# Patient Record
Sex: FEMALE | Race: BLACK OR AFRICAN AMERICAN | NOT HISPANIC OR LATINO
[De-identification: names, ages, dates, MRNs, and addresses within clinical notes are randomized per-mention and may not be internally consistent; named-entity substitution may affect disease eponyms.]

---

## 2020-09-11 PROBLEM — Z00.00 ENCOUNTER FOR PREVENTIVE HEALTH EXAMINATION: Status: ACTIVE | Noted: 2020-09-11

## 2020-09-16 ENCOUNTER — APPOINTMENT (OUTPATIENT)
Dept: NEUROLOGY | Facility: CLINIC | Age: 35
End: 2020-09-16
Payer: MEDICAID

## 2020-09-16 VITALS
HEART RATE: 85 BPM | HEIGHT: 64 IN | TEMPERATURE: 98.1 F | WEIGHT: 253 LBS | SYSTOLIC BLOOD PRESSURE: 117 MMHG | OXYGEN SATURATION: 99 % | DIASTOLIC BLOOD PRESSURE: 83 MMHG | BODY MASS INDEX: 43.19 KG/M2

## 2020-09-16 DIAGNOSIS — Z87.09 PERSONAL HISTORY OF OTHER DISEASES OF THE RESPIRATORY SYSTEM: ICD-10-CM

## 2020-09-16 DIAGNOSIS — Z78.9 OTHER SPECIFIED HEALTH STATUS: ICD-10-CM

## 2020-09-16 DIAGNOSIS — F17.200 NICOTINE DEPENDENCE, UNSPECIFIED, UNCOMPLICATED: ICD-10-CM

## 2020-09-16 DIAGNOSIS — J45.909 UNSPECIFIED ASTHMA, UNCOMPLICATED: ICD-10-CM

## 2020-09-16 DIAGNOSIS — R20.2 ANESTHESIA OF SKIN: ICD-10-CM

## 2020-09-16 DIAGNOSIS — R20.0 ANESTHESIA OF SKIN: ICD-10-CM

## 2020-09-16 LAB
ESTIMATED AVERAGE GLUCOSE: 100 MG/DL
HBA1C MFR BLD HPLC: 5.1 %

## 2020-09-16 PROCEDURE — 99205 OFFICE O/P NEW HI 60 MIN: CPT

## 2020-09-16 RX ORDER — IBUPROFEN 200 MG/1
CAPSULE, LIQUID FILLED ORAL
Refills: 0 | Status: ACTIVE | COMMUNITY

## 2020-09-16 NOTE — REVIEW OF SYSTEMS
[As Noted in HPI] : as noted in HPI [Muscle Weakness] : muscle weakness [Fever] : no fever [Anxiety] : no anxiety [Eyesight Problems] : no eyesight problems [Loss Of Hearing] : no hearing loss [Chest Pain] : no chest pain [Vomiting] : no vomiting [Cough] : no cough [Joint Pain] : no joint pain [Incontinence] : no incontinence [Itching] : no itching [Easy Bleeding] : no tendency for easy bleeding

## 2020-09-16 NOTE — HISTORY OF PRESENT ILLNESS
[FreeTextEntry1] : The patient has h/o of cervical and lumbar radiculopathy which has been stable for years and is here for numbness and tingling in the arms and the legs which started again about 2 weeks ago.  The patient was riding her bike for longer that usual, then played basketball and went to work.  This was more than her usual activity level. Afterwards she started feeling pain radiating to her right leg as well as numbness and tingling in her hands.  She noticed neck and back pain limiting her ambulation.  No bowel or bladder incontiance or saddle anesthesia.  \par \par The patient went to [facility] and had Xrays there, reports are unavailable.  She was started on Neurontin 300mg tid but has taken it only for few days.  She continues to have the pain.

## 2020-09-16 NOTE — ASSESSMENT
[FreeTextEntry1] : The patient has acute on chronic cervical and lumbar radiculopathy, her exam is notable for sensory loss to LT in bl arms and legs in stocking glove distribution raising concerning for possible underlying peripheral neuropathy as well.  WIll get ncs.emg of the arms and the legs, will refer the patient to PT and if the patient's pain continues without relief of the Neurontin 300mg tid she will titrate it to 600mg tid.  Will also get basic neuropathy labs.  The patient will bring copies of her prior Xrays of the C and LS spine.

## 2020-09-16 NOTE — CONSULT LETTER
[Dear  ___] : Dear  [unfilled], [Consult Letter:] : I had the pleasure of evaluating your patient, [unfilled]. [Please see my note below.] : Please see my note below. [Consult Closing:] : Thank you very much for allowing me to participate in the care of this patient.  If you have any questions, please do not hesitate to contact me. [Sincerely,] : Sincerely, [FreeTextEntry3] : Hazel Zimmer MD, MPH\par

## 2020-09-16 NOTE — PHYSICAL EXAM
[General Appearance - Alert] : alert [General Appearance - In No Acute Distress] : in no acute distress [Person] : oriented to person [Place] : oriented to place [Time] : oriented to time [Registration Intact] : recent registration memory intact [Concentration Intact] : normal concentrating ability [Visual Intact] : visual attention was ~T not ~L decreased [Naming Objects] : no difficulty naming common objects [Repeating Phrases] : no difficulty repeating a phrase [Fluency] : fluency intact [Comprehension] : comprehension intact [Vocabulary] : adequate range of vocabulary [Cranial Nerves Optic (II)] : visual acuity intact bilaterally,  visual fields full to confrontation, pupils equal round and reactive to light [Cranial Nerves Oculomotor (III)] : extraocular motion intact [Cranial Nerves Trigeminal (V)] : facial sensation intact symmetrically [Cranial Nerves Facial (VII)] : face symmetrical [Cranial Nerves Vestibulocochlear (VIII)] : hearing was intact bilaterally [Cranial Nerves Glossopharyngeal (IX)] : tongue and palate midline [Cranial Nerves Accessory (XI - Cranial And Spinal)] : head turning and shoulder shrug symmetric [Cranial Nerves Hypoglossal (XII)] : there was no tongue deviation with protrusion [Motor Tone] : muscle tone was normal in all four extremities [Motor Strength] : muscle strength was normal in all four extremities [Involuntary Movements] : no involuntary movements were seen [Abnormal Walk] : normal gait [Balance] : balance was intact [1+] : Ankle jerk left 1+ [Full Pulse] : the pedal pulses are present [Coordination - Dysmetria Impaired Finger-to-Nose Bilateral] : not present [Coordination - Dysmetria Impaired Heel-to-Shin Bilateral] : not present [Plantar Reflex Right Only] : normal on the right [Plantar Reflex Left Only] : normal on the left [FreeTextEntry5] : fundi not visualized [FreeTextEntry7] : sensory loss to LT in bl arms and legs in stocking glove distribution

## 2020-09-18 LAB
ALBUMIN MFR SERPL ELPH: 52.2 %
ALBUMIN SERPL-MCNC: 3.9 G/DL
ALBUMIN/GLOB SERPL: 1.1 RATIO
ALPHA1 GLOB MFR SERPL ELPH: 4.5 %
ALPHA1 GLOB SERPL ELPH-MCNC: 0.3 G/DL
ALPHA2 GLOB MFR SERPL ELPH: 10.6 %
ALPHA2 GLOB SERPL ELPH-MCNC: 0.8 G/DL
B-GLOBULIN MFR SERPL ELPH: 13.1 %
B-GLOBULIN SERPL ELPH-MCNC: 1 G/DL
FOLATE SERPL-MCNC: 8.1 NG/ML
GAMMA GLOB FLD ELPH-MCNC: 1.5 G/DL
GAMMA GLOB MFR SERPL ELPH: 19.6 %
INTERPRETATION SERPL IEP-IMP: NORMAL
M PROTEIN SPEC IFE-MCNC: NORMAL
PROT SERPL-MCNC: 7.4 G/DL
PROT SERPL-MCNC: 7.4 G/DL
T3 SERPL-MCNC: 116 NG/DL
T4 SERPL-MCNC: 6.8 UG/DL
TSH SERPL-ACNC: 1.24 UIU/ML
VIT B12 SERPL-MCNC: 434 PG/ML

## 2020-09-22 LAB — METHYLMALONATE SERPL-SCNC: 127 NMOL/L

## 2020-10-27 ENCOUNTER — APPOINTMENT (OUTPATIENT)
Dept: NEUROLOGY | Facility: CLINIC | Age: 35
End: 2020-10-27
Payer: MEDICAID

## 2020-10-27 VITALS
WEIGHT: 252 LBS | DIASTOLIC BLOOD PRESSURE: 75 MMHG | HEART RATE: 89 BPM | TEMPERATURE: 97.7 F | SYSTOLIC BLOOD PRESSURE: 125 MMHG | BODY MASS INDEX: 43.02 KG/M2 | HEIGHT: 64 IN | OXYGEN SATURATION: 98 %

## 2020-10-27 PROCEDURE — 99072 ADDL SUPL MATRL&STAF TM PHE: CPT

## 2020-10-27 PROCEDURE — 95913 NRV CNDJ TEST 13/> STUDIES: CPT

## 2020-10-27 PROCEDURE — 99213 OFFICE O/P EST LOW 20 MIN: CPT | Mod: 25

## 2020-10-27 PROCEDURE — 95886 MUSC TEST DONE W/N TEST COMP: CPT | Mod: 59

## 2020-10-28 NOTE — ASSESSMENT
[FreeTextEntry1] : The patient has acute on chronic cervical and lumbar radiculopathy, with ncs.emg today showing chronic left L5 and right L4/5 radiculopathy.  WIll cw Neurontin 600mg tid and PT. The patient will bring copies of her prior Xrays of the C and LS spine. \par

## 2020-10-28 NOTE — DATA REVIEWED
[de-identified] : ncs.emg legs shows chronic left L5 and right L4/5 radiculopathy\par basic neuropathy labs unremarkable

## 2020-10-30 ENCOUNTER — APPOINTMENT (OUTPATIENT)
Dept: NEUROLOGY | Facility: CLINIC | Age: 35
End: 2020-10-30
Payer: MEDICAID

## 2020-10-30 VITALS
BODY MASS INDEX: 43.02 KG/M2 | TEMPERATURE: 97.9 F | WEIGHT: 252 LBS | HEART RATE: 86 BPM | HEIGHT: 64 IN | OXYGEN SATURATION: 98 % | DIASTOLIC BLOOD PRESSURE: 81 MMHG | SYSTOLIC BLOOD PRESSURE: 136 MMHG

## 2020-10-30 PROCEDURE — 95913 NRV CNDJ TEST 13/> STUDIES: CPT

## 2020-10-30 PROCEDURE — 99072 ADDL SUPL MATRL&STAF TM PHE: CPT

## 2020-10-30 PROCEDURE — 95886 MUSC TEST DONE W/N TEST COMP: CPT

## 2020-12-18 ENCOUNTER — APPOINTMENT (OUTPATIENT)
Dept: NEUROLOGY | Facility: CLINIC | Age: 35
End: 2020-12-18
Payer: MEDICAID

## 2020-12-18 VITALS
DIASTOLIC BLOOD PRESSURE: 76 MMHG | WEIGHT: 260 LBS | TEMPERATURE: 97.2 F | SYSTOLIC BLOOD PRESSURE: 118 MMHG | HEIGHT: 64 IN | HEART RATE: 92 BPM | BODY MASS INDEX: 44.39 KG/M2

## 2020-12-18 PROCEDURE — 99214 OFFICE O/P EST MOD 30 MIN: CPT

## 2020-12-18 PROCEDURE — 99072 ADDL SUPL MATRL&STAF TM PHE: CPT

## 2020-12-18 RX ORDER — GABAPENTIN 300 MG/1
300 CAPSULE ORAL 3 TIMES DAILY
Qty: 90 | Refills: 5 | Status: DISCONTINUED | COMMUNITY
Start: 2020-09-16 | End: 2020-12-18

## 2020-12-18 NOTE — ASSESSMENT
[FreeTextEntry1] : The patient has acute on chronic cervical and lumbar radiculopathy, with ncs.emg today showing chronic left L5 and right L4/5 radiculopathy and Xray showing  degenerative changes at l4/5. WIll titrate Neurontin to 900mg tid and cw PT.  Will obtain MRI LS spine.\par

## 2020-12-18 NOTE — PHYSICAL EXAM
[General Appearance - Alert] : alert [General Appearance - In No Acute Distress] : in no acute distress [Person] : oriented to person [Place] : oriented to place [Time] : oriented to time [Registration Intact] : recent registration memory intact [Concentration Intact] : normal concentrating ability [Naming Objects] : no difficulty naming common objects [Fluency] : fluency intact [Vocabulary] : adequate range of vocabulary [Cranial Nerves Optic (II)] : visual acuity intact bilaterally,  visual fields full to confrontation, pupils equal round and reactive to light [Cranial Nerves Oculomotor (III)] : extraocular motion intact [Cranial Nerves Trigeminal (V)] : facial sensation intact symmetrically [Cranial Nerves Facial (VII)] : face symmetrical [Motor Tone] : muscle tone was normal in all four extremities [Motor Strength] : muscle strength was normal in all four extremities [Sensation Tactile Decrease] : light touch was intact [Abnormal Walk] : normal gait [Balance] : balance was intact

## 2020-12-18 NOTE — HISTORY OF PRESENT ILLNESS
[FreeTextEntry1] : The patient has h/o of cervical and lumbar radiculopathy which has been stable for years and is here for numbness and tingling in the arms and the legs which started again about 2 months ago. The patient was riding her bike for longer that usual, then played basketball and went to work. This was more than her usual activity level. Afterwards she started feeling pain radiating to her right leg as well as numbness and tingling in her hands. She noticed neck and back pain limiting her ambulation. No bowel or bladder incontiance or saddle anesthesia. \par \par She was started on Neurontin 600mg tid and PT with improved control of the pain.  She however continues to have significant pain most of the days.\par

## 2021-01-08 ENCOUNTER — APPOINTMENT (OUTPATIENT)
Dept: NEUROLOGY | Facility: CLINIC | Age: 36
End: 2021-01-08
Payer: MEDICAID

## 2021-01-08 PROCEDURE — ZZZZZ: CPT

## 2021-01-08 NOTE — ASSESSMENT
[FreeTextEntry1] : The patient has acute on chronic cervical and lumbar radiculopathy, with ncs.emg today showing chronic left L5 and right L4/5 radiculopathy and Xray showing degenerative changes at l4/5 and MRI LS spine showing disk extrusion compressing on the right L5 nerve root. WIll taper off Neurontin 600mg tidx 1 week,  then 300mg tid x1 week then off.  Will start Cymbalta 30mg today and refer to PT, pain management for epidural and neurosurgery for decompression.\par \par

## 2021-01-08 NOTE — HISTORY OF PRESENT ILLNESS
[Verbal consent obtained from patient] : the patient, [unfilled] [FreeTextEntry4] : Neetu Donaldson [FreeTextEntry1] : The patient has h/o of cervical and lumbar radiculopathy which has been stable for years and is here for numbness and tingling in the arms and the legs which started again about 2 months ago. The patient was riding her bike for longer that usual, then played basketball and went to work. This was more than her usual activity level. Afterwards she started feeling pain radiating to her right leg as well as numbness and tingling in her hands. She noticed neck and back pain limiting her ambulation. No bowel or bladder inconstance or saddle anesthesia. \par \par She is on Neurontin 900mg tid and PT without significant improvement of the pain.  She is interested in stopping the Neurontin and trying another medication.\par

## 2021-03-18 ENCOUNTER — APPOINTMENT (OUTPATIENT)
Dept: NEUROLOGY | Facility: CLINIC | Age: 36
End: 2021-03-18
Payer: MEDICAID

## 2021-03-18 VITALS
OXYGEN SATURATION: 99 % | BODY MASS INDEX: 44.39 KG/M2 | DIASTOLIC BLOOD PRESSURE: 78 MMHG | HEART RATE: 85 BPM | TEMPERATURE: 98.9 F | SYSTOLIC BLOOD PRESSURE: 121 MMHG | HEIGHT: 64 IN | WEIGHT: 260 LBS

## 2021-03-18 PROCEDURE — 99072 ADDL SUPL MATRL&STAF TM PHE: CPT

## 2021-03-18 PROCEDURE — 99214 OFFICE O/P EST MOD 30 MIN: CPT

## 2021-03-18 RX ORDER — GABAPENTIN 300 MG/1
300 CAPSULE ORAL
Qty: 180 | Refills: 5 | Status: DISCONTINUED | COMMUNITY
Start: 2020-09-16 | End: 2021-03-18

## 2021-03-18 RX ORDER — GABAPENTIN 300 MG/1
300 CAPSULE ORAL
Qty: 270 | Refills: 5 | Status: DISCONTINUED | COMMUNITY
Start: 2020-12-18 | End: 2021-03-18

## 2021-03-18 NOTE — ASSESSMENT
[FreeTextEntry1] : The patient has acute on chronic cervical and lumbar radiculopathy, with ncs.emg today showing chronic left L5 and right L4/5 radiculopathy and Xray showing degenerative changes at l4/5 and MRI LS spine showing disk extrusion compressing on the right L5 nerve root. Off Neurontin, will increase Cymbalta from 30 to 60mg today.  Will refer again to PT, pain management for epidural and neurosurgery for decompression.\par \par

## 2021-03-18 NOTE — HISTORY OF PRESENT ILLNESS
[FreeTextEntry1] : \par The patient has h/o of cervical and lumbar radiculopathy which has been stable for years and is here for numbness and tingling in the arms and the legs which started again about 2 months ago. The patient was riding her bike for longer that usual, then played basketball and went to work. This was more than her usual activity level. Afterwards she started feeling pain radiating to her right leg as well as numbness and tingling in her hands. She noticed neck and back pain limiting her ambulation. No bowel or bladder inconstance or saddle anesthesia. \par \par She is now off Neurontin and on Cymbalta 30mg with some improvement of the pain, but still has pain.  Does PT.\par

## 2021-03-18 NOTE — PHYSICAL EXAM
[General Appearance - Alert] : alert [General Appearance - In No Acute Distress] : in no acute distress [Person] : oriented to person [Place] : oriented to place [Time] : oriented to time [Concentration Intact] : normal concentrating ability [Naming Objects] : no difficulty naming common objects [Fluency] : fluency intact [Vocabulary] : adequate range of vocabulary [Motor Tone] : muscle tone was normal in all four extremities [Motor Strength] : muscle strength was normal in all four extremities [Involuntary Movements] : no involuntary movements were seen [Sensation Tactile Decrease] : light touch was intact [Abnormal Walk] : normal gait [Balance] : balance was intact

## 2021-05-24 NOTE — REVIEW OF SYSTEMS
[Leg Weakness] : leg weakness [Numbness] : numbness [Tingling] : tingling [Abnormal Sensation] : an abnormal sensation [Limping] : limping [Negative] : Constitutional

## 2021-05-25 ENCOUNTER — APPOINTMENT (OUTPATIENT)
Dept: NEUROSURGERY | Facility: CLINIC | Age: 36
End: 2021-05-25
Payer: MEDICAID

## 2021-05-25 VITALS
SYSTOLIC BLOOD PRESSURE: 131 MMHG | HEIGHT: 64 IN | WEIGHT: 260 LBS | HEART RATE: 70 BPM | OXYGEN SATURATION: 100 % | TEMPERATURE: 97.6 F | BODY MASS INDEX: 44.39 KG/M2 | DIASTOLIC BLOOD PRESSURE: 94 MMHG

## 2021-05-25 DIAGNOSIS — M54.9 DORSALGIA, UNSPECIFIED: ICD-10-CM

## 2021-05-25 PROCEDURE — 99203 OFFICE O/P NEW LOW 30 MIN: CPT

## 2021-05-26 PROBLEM — M54.9 PAIN IN BACK: Status: ACTIVE | Noted: 2021-05-26

## 2021-05-26 NOTE — ASSESSMENT
[FreeTextEntry1] : She has a lot of pain but she has only done PT. I will have her do xays and pain management. I think she needs surgery but I would like her to loose weight first.

## 2021-05-26 NOTE — PHYSICAL EXAM
[Straight-Leg Raise Test - Right] : straight leg raise of the right leg was positive [Pain / Temp Decrease Lateral Leg & Dorsum Of Foot Right Only] : L5 sensory impairment [Pain / Temp Decrease Lateral Leg & Dorsum Of Foot Left Only] : L5 sensory impairment [5] : 5/5 Ankle Plantar Flexion (S1) [2] : 2/4 Ankle Jerk Reflex

## 2021-05-26 NOTE — HISTORY OF PRESENT ILLNESS
[de-identified] : 36 years old female here today for a neurosurgical evaluation of lower back pain. The patient presents with new onset of lower back pain with radiation initially to the right leg since August 2020. She was riding a bike and develop lower back pain. Currently the pain is now affecting both lower extremities with the right leg being the worst.  The pain is described as a constant shooting, stabbing, stiffness, aching, burning, dull, tension,and throbbing. Pain is accompanied with numbness, tingling and weakness.  Pain level ranges in which if she is resting the pain can range anywhere between 3 and 4 but when she is active it can go as high as a 9. Aggravating factors are sitting, driving, bending, walking, and exertional activities. For conservative management she has been under the care of physical therapy since October 2020 and it provided minimal relief of symptoms. She is scheduled to follow-up with pain management on June 1, 2021 but wanted to follow-up with neurosurgery before moving forward with the injection.  Denies incontinence.  \par \par \par Has a pain injection scheduled June 1, 2021

## 2021-05-26 NOTE — REASON FOR VISIT
[New Patient Visit] : a new patient visit [Referred By: _________] : Patient was referred by SEBASTIEN [FreeTextEntry1] : Lower back pain

## 2021-06-21 ENCOUNTER — APPOINTMENT (OUTPATIENT)
Dept: NEUROLOGY | Facility: CLINIC | Age: 36
End: 2021-06-21
Payer: MEDICAID

## 2021-06-21 VITALS
DIASTOLIC BLOOD PRESSURE: 79 MMHG | BODY MASS INDEX: 45.75 KG/M2 | TEMPERATURE: 97.3 F | HEIGHT: 64 IN | WEIGHT: 268 LBS | HEART RATE: 79 BPM | SYSTOLIC BLOOD PRESSURE: 112 MMHG | OXYGEN SATURATION: 98 %

## 2021-06-21 DIAGNOSIS — E66.01 MORBID (SEVERE) OBESITY DUE TO EXCESS CALORIES: ICD-10-CM

## 2021-06-21 PROCEDURE — 99214 OFFICE O/P EST MOD 30 MIN: CPT

## 2021-06-21 RX ORDER — DULOXETINE HYDROCHLORIDE 60 MG/1
60 CAPSULE, DELAYED RELEASE PELLETS ORAL
Qty: 30 | Refills: 5 | Status: DISCONTINUED | COMMUNITY
Start: 2021-03-18 | End: 2021-06-21

## 2021-06-21 RX ORDER — IBUPROFEN 400 MG/1
400 TABLET ORAL 3 TIMES DAILY
Qty: 30 | Refills: 5 | Status: ACTIVE | COMMUNITY
Start: 2021-06-21 | End: 1900-01-01

## 2021-06-21 NOTE — PHYSICAL EXAM
[General Appearance - Alert] : alert [General Appearance - In No Acute Distress] : in no acute distress [Motor Tone] : muscle tone was normal in all four extremities [Motor Strength] : muscle strength was normal in all four extremities [Involuntary Movements] : no involuntary movements were seen [Sensation Tactile Decrease] : light touch was intact [Abnormal Walk] : normal gait [Balance] : balance was intact [1+] : Ankle jerk left 1+

## 2021-06-21 NOTE — HISTORY OF PRESENT ILLNESS
[FreeTextEntry1] : The patient has h/o of cervical and lumbar radiculopathy which has been stable for years and is here for numbness and tingling in the arms and the legs which started again about 2 months ago. The patient was riding her bike for longer that usual, then played basketball and went to work. This was more than her usual activity level. Afterwards she started feeling pain radiating to her right leg as well as numbness and tingling in her hands. She noticed neck and back pain limiting her ambulation. No bowel or bladder inconstance or saddle anesthesia. \par \par At last visit, the patient was off Neurontin and she was on Cymbalta 30 mg, she was advised to increase the Cymbalta to 60 mg.  She said that she did not have significant improvement of symptoms on Cymbalta 60 mg.  She received an epidural with some improvement of symptoms, she is now able to tie her shoelaces which is an improvement.  Pain management also stopped the Cymbalta and started Neurontin 600 milligrams twice a day, without any resolution of symptoms.  The patient was seen with neurosurgery and advised to lose weight prior to surgical intervention.  The patient has been attempting weight loss.  Increase activity, walking, without significant success.  She is interested in bariatric surgery as well as nutritionist referral.

## 2021-06-21 NOTE — ASSESSMENT
[FreeTextEntry1] : The patient has acute on chronic cervical and lumbar radiculopathy, with ncs.emg today showing chronic left L5 and right L4/5 radiculopathy and Xray showing degenerative changes at l4/5 and MRI LS spine showing disk extrusion compressing on the right L5 nerve root.  The patient did not have significant improvement of the pain on Neurontin 600 mg 3 times a day nor Cymbalta 60 milligrams daily, when used individually.  She is currently on Neurontin 600 mg twice a day, will plan to add Cymbalta 30 mg to just regiment for better pain control.  Patient will continue with physical therapy at home, she will attempt to do it twice a day, as as well as PT with the therapist.  We will refer the patient to nutritionist as well as to surgery for bariatric evaluation.\par \par I spent the time noted on the day of this patient encounter preparing for, providing and documenting the above E/M service and counseling and educate patient on differential, workup, disease course, and treatment/management. Education was provided to the patient during this encounter. All questions and concerns were answered and addressed in detail. The patient verbalized understanding and agreed to plan. Patient was advised to continue to monitor for neurologic symptoms and to notify my office or go to the nearest emergency room if there are any changes. Any orders/referrals and communications were provided as well. \par Side effects of the above medications were discussed in detail including but not limited to applicable black box warning and teratogenicity as appropriate. \par Patient was advised to bring previous records to my office, including CD of imaging, when applicable. \par \par

## 2021-11-16 ENCOUNTER — APPOINTMENT (OUTPATIENT)
Dept: NEUROLOGY | Facility: CLINIC | Age: 36
End: 2021-11-16
Payer: MEDICAID

## 2021-11-16 VITALS
DIASTOLIC BLOOD PRESSURE: 71 MMHG | SYSTOLIC BLOOD PRESSURE: 119 MMHG | OXYGEN SATURATION: 98 % | TEMPERATURE: 97.9 F | HEIGHT: 64 IN | BODY MASS INDEX: 46.1 KG/M2 | WEIGHT: 270 LBS | HEART RATE: 82 BPM

## 2021-11-16 PROCEDURE — 99214 OFFICE O/P EST MOD 30 MIN: CPT

## 2021-11-16 RX ORDER — GABAPENTIN 600 MG/1
600 TABLET, COATED ORAL
Qty: 210 | Refills: 5 | Status: DISCONTINUED | COMMUNITY
Start: 2021-06-21 | End: 2021-11-16

## 2021-11-16 RX ORDER — ERGOCALCIFEROL 1.25 MG/1
1.25 MG CAPSULE, LIQUID FILLED ORAL
Qty: 4 | Refills: 0 | Status: ACTIVE | COMMUNITY
Start: 2021-11-11

## 2021-11-16 NOTE — ASSESSMENT
[FreeTextEntry1] : The patient has acute on chronic cervical and lumbar radiculopathy, with ncs.emg today showing chronic left L5 and right L4/5 radiculopathy and Xray showing degenerative changes at l4/5 and MRI LS spine showing disk extrusion compressing on the right L5 nerve root. The patient did not have significant improvement of the pain on Neurontin 600 mg 3 times a day,, stopped.  She is now doing well on Cymbalta 60 milligrams daily as well as physical therapy.  Will renew physical therapy.  The patient will continue to attempt to lose weight, she is pending evaluation for bariatric surgery.\par \par Additionally, the patient has new blurry vision associated with right facial subjective numbness, concerning for possible pseudotumor cerebri, given the patient's weight as well as weight fluctuation.  We will advised the patient to get MRI of the brain, as well as lumbar puncture with opening pressure to evaluate for pseudotumor cerebri.  We will also refer the patient to neuroophthalmologist for dilated exam and evaluation..\par \par I spent the time noted on the day of this patient encounter preparing for, providing and documenting the above E/M service and counseling and educate patient on differential, workup, disease course, and treatment/management. Education was provided to the patient during this encounter. All questions and concerns were answered and addressed in detail. The patient verbalized understanding and agreed to plan. Patient was advised to continue to monitor for neurologic symptoms and to notify my office or go to the nearest emergency room if there are any changes. Any orders/referrals and communications were provided as well. \par Side effects of the above medications were discussed in detail including but not limited to applicable black box warning and teratogenicity as appropriate. \par Patient was advised to bring previous records to my office, including CD of imaging, when applicable. \par \par

## 2021-11-16 NOTE — HISTORY OF PRESENT ILLNESS
[FreeTextEntry1] : The patient has h/o of cervical and lumbar radiculopathy which has been stable for years and is here for numbness and tingling in the arms and the legs which started again about 2 months ago. The patient was riding her bike for longer that usual, then played basketball and went to work. This was more than her usual activity level. Afterwards she started feeling pain radiating to her right leg as well as numbness and tingling in her hands. She noticed neck and back pain limiting her ambulation. No bowel or bladder inconstance or saddle anesthesia. \par \par The patient is off Neurontin and she on Cymbalta 60 mg. She received an epidural with some improvement of symptoms.  Patient was seen by physical therapy and had significant improvement of her back pain with the physical therapist.\par \par The patient was previously seen with neurosurgery and advised to lose weight prior to surgical intervention. The patient has been attempting weight loss. Increase activity, walking, without significant success. \par \par Additionally, since last visit the patient has new onset of blurry vision without associated tenderness.  There is numbness in the right side of her face.  Her weight has been fluctuating up and down.\par \par \par

## 2022-01-18 ENCOUNTER — OUTPATIENT (OUTPATIENT)
Dept: OUTPATIENT SERVICES | Facility: HOSPITAL | Age: 37
LOS: 1 days | End: 2022-01-18
Payer: MEDICAID

## 2022-01-18 ENCOUNTER — APPOINTMENT (OUTPATIENT)
Dept: MRI IMAGING | Facility: HOSPITAL | Age: 37
End: 2022-01-18
Payer: MEDICAID

## 2022-01-18 DIAGNOSIS — R51.9 HEADACHE, UNSPECIFIED: ICD-10-CM

## 2022-01-18 DIAGNOSIS — M54.16 RADICULOPATHY, LUMBAR REGION: ICD-10-CM

## 2022-01-18 DIAGNOSIS — M54.12 RADICULOPATHY, CERVICAL REGION: ICD-10-CM

## 2022-01-18 PROCEDURE — 70551 MRI BRAIN STEM W/O DYE: CPT | Mod: 26

## 2022-01-18 PROCEDURE — 72114 X-RAY EXAM L-S SPINE BENDING: CPT | Mod: 26

## 2022-01-18 PROCEDURE — 70551 MRI BRAIN STEM W/O DYE: CPT

## 2022-01-18 PROCEDURE — 72114 X-RAY EXAM L-S SPINE BENDING: CPT

## 2022-01-21 ENCOUNTER — NON-APPOINTMENT (OUTPATIENT)
Age: 37
End: 2022-01-21

## 2022-01-21 ENCOUNTER — APPOINTMENT (OUTPATIENT)
Dept: OPHTHALMOLOGY | Facility: CLINIC | Age: 37
End: 2022-01-21
Payer: MEDICAID

## 2022-01-21 PROCEDURE — 92004 COMPRE OPH EXAM NEW PT 1/>: CPT

## 2022-01-21 PROCEDURE — 92133 CPTRZD OPH DX IMG PST SGM ON: CPT

## 2022-11-04 ENCOUNTER — APPOINTMENT (OUTPATIENT)
Dept: OPHTHALMOLOGY | Facility: CLINIC | Age: 37
End: 2022-11-04

## 2022-12-02 ENCOUNTER — APPOINTMENT (OUTPATIENT)
Dept: NEUROLOGY | Facility: CLINIC | Age: 37
End: 2022-12-02

## 2022-12-02 VITALS
HEIGHT: 64 IN | HEART RATE: 70 BPM | SYSTOLIC BLOOD PRESSURE: 128 MMHG | WEIGHT: 286 LBS | OXYGEN SATURATION: 95 % | BODY MASS INDEX: 48.83 KG/M2 | DIASTOLIC BLOOD PRESSURE: 85 MMHG | TEMPERATURE: 98.6 F

## 2022-12-02 DIAGNOSIS — M51.26 OTHER INTERVERTEBRAL DISC DISPLACEMENT, LUMBAR REGION: ICD-10-CM

## 2022-12-02 PROCEDURE — 99214 OFFICE O/P EST MOD 30 MIN: CPT

## 2022-12-02 RX ORDER — HALOBETASOL PROPIONATE 0.5 MG/G
0.05 CREAM TOPICAL
Qty: 50 | Refills: 0 | Status: ACTIVE | COMMUNITY
Start: 2022-07-20

## 2022-12-02 RX ORDER — DULOXETINE HYDROCHLORIDE 30 MG/1
30 CAPSULE, DELAYED RELEASE PELLETS ORAL
Qty: 30 | Refills: 5 | Status: ACTIVE | COMMUNITY
Start: 2021-01-08 | End: 1900-01-01

## 2022-12-02 RX ORDER — KETOCONAZOLE 20 MG/G
2 CREAM TOPICAL
Qty: 60 | Refills: 0 | Status: ACTIVE | COMMUNITY
Start: 2022-09-20

## 2022-12-02 RX ORDER — TERBINAFINE HYDROCHLORIDE 250 MG/1
250 TABLET ORAL
Qty: 30 | Refills: 0 | Status: ACTIVE | COMMUNITY
Start: 2022-09-20

## 2022-12-02 NOTE — HISTORY OF PRESENT ILLNESS
[FreeTextEntry1] : The patient has h/o of cervical and lumbar radiculopathy which has been stable for years and is here for numbness and tingling in the arms and the legs which started again about 2 months ago. The patient was riding her bike for longer that usual, then played basketball and went to work. This was more than her usual activity level. Afterwards she started feeling pain radiating to her right leg as well as numbness and tingling in her hands. She noticed neck and back pain limiting her ambulation. No bowel or bladder inconstance or saddle anesthesia. \par \par The patient is off Neurontin and she on Cymbalta 60 mg. She received an epidural with some improvement of symptoms. Patient was seen by physical therapy and had significant improvement of her back pain with the physical therapist.\par \par The patient was previously seen with neurosurgery and advised to lose weight prior to surgical intervention. The patient has been attempting weight loss. Increase activity, walking, without significant success. \par \par Since last visit, headaches have improved the patient has infrequent headache about once per week, last for about half an hour associated with photo and phonophobia.  She has been seen by ophthalmology as well as had an MRI of the brain without any signs of pseudotumor cerebri.  She did not do the LP with opening pressures since last visit.\par \par The patient continues to have back pain, she is still benefited with physical therapy is not interested in more physical therapy as well.  She also benefited with Cymbalta 60 mg without significant side effects.\par

## 2022-12-02 NOTE — DATA REVIEWED
[de-identified] : Unremarkable.  No evidence of pseudotumor cerebri. [de-identified] : Ophthalmology note appreciated

## 2022-12-02 NOTE — ASSESSMENT
[FreeTextEntry1] : The patient has acute on chronic cervical and lumbar radiculopathy, with ncs.emg today showing chronic left L5 and right L4/5 radiculopathy and Xray showing degenerative changes at l4/5 and MRI LS spine showing disk extrusion compressing on the right L5 nerve root. The patient did not have significant improvement of the pain on Neurontin 600 mg 3 times a day, stopped. She has been feeling well with Cymbalta 60 milligrams daily as well as physical therapy. Will renew physical therapy in prescribe Cymbalta 60 mg.. The patient will continue to attempt to lose weight, she is not interested in bariatric surgery at this time.\par \par The patient has headaches with migrainous features occurring once per week lasting for about 30 minutes.  The patient is not interested in medications.  She is advised that she could take NSAIDs or Tylenol as needed at onset of headache.  There is no need for prevention as the headaches happen relatively infrequently.  The patient was seen by ophthalmology and there was no signs of pseudotumor cerebri on valid examination.  She also had an MRI of the brain which did not show any signs of pseudotumor cerebri.  The patient did not have recommended lumbar puncture.  We will continue to monitor.\par \par I spent the time noted on the day of this patient encounter preparing for, providing and documenting the above E/M service and counseling and educate patient on differential, workup, disease course, and treatment/management. Education was provided to the patient during this encounter. All questions and concerns were answered and addressed in detail. The patient verbalized understanding and agreed to plan. Patient was advised to continue to monitor for neurologic symptoms and to notify my office or go to the nearest emergency room if there are any changes. Any orders/referrals and communications were provided as well. \par Side effects of the above medications were discussed in detail including but not limited to applicable black box warning and teratogenicity as appropriate. \par Patient was advised to bring previous records to my office, including CD of imaging, when applicable.

## 2022-12-02 NOTE — PHYSICAL EXAM
[General Appearance - Alert] : alert [General Appearance - In No Acute Distress] : in no acute distress [Cranial Nerves Optic (II)] : visual acuity intact bilaterally,  visual fields full to confrontation, pupils equal round and reactive to light [Cranial Nerves Oculomotor (III)] : extraocular motion intact [Cranial Nerves Trigeminal (V)] : facial sensation intact symmetrically [Cranial Nerves Facial (VII)] : face symmetrical [Motor Tone] : muscle tone was normal in all four extremities [Motor Strength] : muscle strength was normal in all four extremities [Involuntary Movements] : no involuntary movements were seen [Sensation Tactile Decrease] : light touch was intact [Abnormal Walk] : normal gait [Balance] : balance was intact

## 2022-12-21 ENCOUNTER — APPOINTMENT (OUTPATIENT)
Dept: OBGYN | Facility: CLINIC | Age: 37
End: 2022-12-21
Payer: COMMERCIAL

## 2022-12-21 VITALS
TEMPERATURE: 98.3 F | OXYGEN SATURATION: 98 % | WEIGHT: 286 LBS | DIASTOLIC BLOOD PRESSURE: 86 MMHG | SYSTOLIC BLOOD PRESSURE: 143 MMHG | HEIGHT: 64 IN | HEART RATE: 79 BPM | RESPIRATION RATE: 16 BRPM | BODY MASS INDEX: 48.83 KG/M2

## 2022-12-21 DIAGNOSIS — Z01.419 ENCOUNTER FOR GYNECOLOGICAL EXAMINATION (GENERAL) (ROUTINE) W/OUT ABNORMAL FINDINGS: ICD-10-CM

## 2022-12-21 PROCEDURE — 99385 PREV VISIT NEW AGE 18-39: CPT

## 2022-12-21 NOTE — HISTORY OF PRESENT ILLNESS
[FreeTextEntry1] : JOELLE ROJAS 36 YO, presents for Annual\par G 0   \par LMP: 12/04/2022 - Irregular Menses\par Sexually active with same sex partner. \par Medication: Duloxetine\par No family history of breast cancer.\par To do monthly SBE.\par Obesity & Menstrual disturbances discussion @ length. \par  \par \par \par

## 2022-12-21 NOTE — PHYSICAL EXAM
[Soft] : soft [Non-tender] : non-tender [FreeTextEntry7] : Obesity [Examination Of The Breasts] : a normal appearance [No Masses] : no breast masses were palpable [Labia Majora] : normal [Labia Minora] : normal [Normal] : normal [Uterine Adnexae] : normal

## 2022-12-22 LAB
C TRACH RRNA SPEC QL NAA+PROBE: NOT DETECTED
N GONORRHOEA RRNA SPEC QL NAA+PROBE: NOT DETECTED
SOURCE TP AMPLIFICATION: NORMAL

## 2023-01-05 LAB — CYTOLOGY CVX/VAG DOC THIN PREP: NORMAL

## 2023-02-16 ENCOUNTER — TRANSCRIPTION ENCOUNTER (OUTPATIENT)
Age: 38
End: 2023-02-16

## 2023-06-05 ENCOUNTER — APPOINTMENT (OUTPATIENT)
Dept: NEUROLOGY | Facility: CLINIC | Age: 38
End: 2023-06-05
Payer: COMMERCIAL

## 2023-06-05 VITALS
BODY MASS INDEX: 50.02 KG/M2 | WEIGHT: 293 LBS | TEMPERATURE: 97.9 F | HEIGHT: 64 IN | SYSTOLIC BLOOD PRESSURE: 135 MMHG | HEART RATE: 78 BPM | OXYGEN SATURATION: 98 % | DIASTOLIC BLOOD PRESSURE: 91 MMHG

## 2023-06-05 DIAGNOSIS — M54.12 RADICULOPATHY, CERVICAL REGION: ICD-10-CM

## 2023-06-05 DIAGNOSIS — R51.9 HEADACHE, UNSPECIFIED: ICD-10-CM

## 2023-06-05 DIAGNOSIS — M54.16 RADICULOPATHY, LUMBAR REGION: ICD-10-CM

## 2023-06-05 DIAGNOSIS — E66.9 OBESITY, UNSPECIFIED: ICD-10-CM

## 2023-06-05 DIAGNOSIS — M54.9 DORSALGIA, UNSPECIFIED: ICD-10-CM

## 2023-06-05 PROCEDURE — 99214 OFFICE O/P EST MOD 30 MIN: CPT
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
## 2023-06-05 NOTE — PHYSICAL EXAM
[General Appearance - Alert] : alert [General Appearance - In No Acute Distress] : in no acute distress [Person] : oriented to person [Place] : oriented to place [Time] : oriented to time [Concentration Intact] : normal concentrating ability [Naming Objects] : no difficulty naming common objects [Fluency] : fluency intact [Comprehension] : comprehension intact [Vocabulary] : adequate range of vocabulary [Cranial Nerves Trigeminal (V)] : facial sensation intact symmetrically [Cranial Nerves Facial (VII)] : face symmetrical [Motor Tone] : muscle tone was normal in all four extremities [Motor Strength] : muscle strength was normal in all four extremities [Involuntary Movements] : no involuntary movements were seen [Sensation Tactile Decrease] : light touch was intact [Abnormal Walk] : normal gait [Balance] : balance was intact
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
## 2023-06-05 NOTE — DATA REVIEWED
[de-identified] : 2022 normal [de-identified] : GYN note appreciated\par Hemoglobin A1c noted\par GC chlamydia noted

## 2023-09-27 ENCOUNTER — TRANSCRIPTION ENCOUNTER (OUTPATIENT)
Age: 38
End: 2023-09-27

## 2024-08-14 ENCOUNTER — APPOINTMENT (OUTPATIENT)
Dept: OBGYN | Facility: CLINIC | Age: 39
End: 2024-08-14
Payer: MEDICAID

## 2024-08-14 VITALS
RESPIRATION RATE: 16 BRPM | DIASTOLIC BLOOD PRESSURE: 78 MMHG | OXYGEN SATURATION: 98 % | HEART RATE: 86 BPM | WEIGHT: 293 LBS | BODY MASS INDEX: 50.02 KG/M2 | SYSTOLIC BLOOD PRESSURE: 110 MMHG | HEIGHT: 64 IN

## 2024-08-14 DIAGNOSIS — Z01.419 ENCOUNTER FOR GYNECOLOGICAL EXAMINATION (GENERAL) (ROUTINE) W/OUT ABNORMAL FINDINGS: ICD-10-CM

## 2024-08-14 PROCEDURE — 36415 COLL VENOUS BLD VENIPUNCTURE: CPT

## 2024-08-14 PROCEDURE — 99395 PREV VISIT EST AGE 18-39: CPT

## 2024-08-14 NOTE — HISTORY OF PRESENT ILLNESS
[FreeTextEntry1] : JOELLE ROJAS 38 YO, presents for Annual G 0 LMP: 07/22/24 - Regular menses 06/01, 06/16, 07/22 & now. Sexually active, with same sex partner Medication: Duloxetine. Magnesium No family history of breast cancer Complains of period 2 times/month for past 3 months Now bleeding since 08/05 on & off.  Obesity and endometrial hyperplasia to adenocarcinoma informed.  Discussion about weight loss options. To see PCP/Endocrinologist.  To return for endometrial biopsy.

## 2024-08-15 LAB
C TRACH RRNA SPEC QL NAA+PROBE: NOT DETECTED
HCG SERPL-MCNC: <1 MIU/ML
HCT VFR BLD CALC: 43.5 %
HGB BLD-MCNC: 12.5 G/DL
MCHC RBC-ENTMCNC: 24.6 PG
MCHC RBC-ENTMCNC: 28.7 GM/DL
MCV RBC AUTO: 85.6 FL
N GONORRHOEA RRNA SPEC QL NAA+PROBE: NOT DETECTED
PLATELET # BLD AUTO: 422 K/UL
RBC # BLD: 5.08 M/UL
RBC # FLD: 17.5 %
SOURCE TP AMPLIFICATION: NORMAL
TSH SERPL-ACNC: 1.08 UIU/ML
WBC # FLD AUTO: 6.03 K/UL

## 2024-08-16 ENCOUNTER — APPOINTMENT (OUTPATIENT)
Dept: OBGYN | Facility: CLINIC | Age: 39
End: 2024-08-16
Payer: MEDICAID

## 2024-08-16 VITALS
WEIGHT: 293 LBS | HEIGHT: 64 IN | OXYGEN SATURATION: 98 % | RESPIRATION RATE: 16 BRPM | DIASTOLIC BLOOD PRESSURE: 78 MMHG | HEART RATE: 89 BPM | BODY MASS INDEX: 50.02 KG/M2 | SYSTOLIC BLOOD PRESSURE: 114 MMHG

## 2024-08-16 DIAGNOSIS — E66.9 OBESITY, UNSPECIFIED: ICD-10-CM

## 2024-08-16 DIAGNOSIS — N92.0 EXCESSIVE AND FREQUENT MENSTRUATION WITH REGULAR CYCLE: ICD-10-CM

## 2024-08-16 PROCEDURE — 58100 BIOPSY OF UTERUS LINING: CPT

## 2024-08-16 PROCEDURE — 99213 OFFICE O/P EST LOW 20 MIN: CPT | Mod: 25

## 2024-08-16 NOTE — PROCEDURE
[Endometrial Biopsy] : Endometrial biopsy [Time out performed] : Pre-procedure time out performed.  Patient's name, date of birth and procedure confirmed. [Consent Obtained] : Consent obtained [Irregular Bleeding] : irregular uterine bleeding [Risks] : risks [Benefits] : benefits [Alternatives] : alternatives [Patient] : patient [Infection] : infection [Bleeding] : bleeding [Allergic Reaction] : allergic reaction [Uterine Perforation] : uterine perforation [Pain] : pain [Negative] : negative pregnancy test [No Premedication] : No premedication [Betadine] : Betadine [None] : none [Tenaculum] : Tenaculum [Easy Passage] : Easy passage [Anteverted] : anteverted [Moderate] : moderate [Specimen Collected] : collected [Sent to Pathology] : placed in buffered formalin and sent for pathology [ECC] : Endocervical curettage was also performed [Tolerated Well] : Patient tolerated the procedure well [No Complications] : No complications

## 2024-08-16 NOTE — HISTORY OF PRESENT ILLNESS
[FreeTextEntry1] : JOELLE ROJAS 38 YO, presents for Results & Biopsy G 0 LMP: 07/22/24 - Regular menses 06/01, 06/16, 07/22 & now. Sexually active, with same sex partner Medication: Duloxetine. Magnesium.  Blood results- Reviewed.  HCG- NEGATIVE., TSH- WNR, CBC- H/H- 12.5/43.5. NG & CT- Not detected.  PAP- Pending. Endometrial procedure discussed.  All questions answered & consented.  To go for Pelvic sonogram. To call for results.

## 2024-08-20 LAB — CYTOLOGY CVX/VAG DOC THIN PREP: ABNORMAL

## 2024-08-22 LAB — CORE LAB BIOPSY: NORMAL

## 2024-12-24 ENCOUNTER — NON-APPOINTMENT (OUTPATIENT)
Age: 39
End: 2024-12-24

## 2024-12-24 ENCOUNTER — APPOINTMENT (OUTPATIENT)
Dept: FAMILY MEDICINE | Facility: CLINIC | Age: 39
End: 2024-12-24
Payer: MEDICAID

## 2024-12-24 VITALS
BODY MASS INDEX: 50.02 KG/M2 | WEIGHT: 293 LBS | DIASTOLIC BLOOD PRESSURE: 80 MMHG | TEMPERATURE: 98.3 F | OXYGEN SATURATION: 96 % | HEART RATE: 84 BPM | HEIGHT: 64 IN | SYSTOLIC BLOOD PRESSURE: 122 MMHG

## 2024-12-24 DIAGNOSIS — Z82.49 FAMILY HISTORY OF ISCHEMIC HEART DISEASE AND OTHER DISEASES OF THE CIRCULATORY SYSTEM: ICD-10-CM

## 2024-12-24 DIAGNOSIS — Z13.21 ENCOUNTER FOR SCREENING FOR NUTRITIONAL DISORDER: ICD-10-CM

## 2024-12-24 DIAGNOSIS — Z00.00 ENCOUNTER FOR GENERAL ADULT MEDICAL EXAMINATION W/OUT ABNORMAL FINDINGS: ICD-10-CM

## 2024-12-24 DIAGNOSIS — L30.9 DERMATITIS, UNSPECIFIED: ICD-10-CM

## 2024-12-24 DIAGNOSIS — Z13.6 ENCOUNTER FOR SCREENING FOR CARDIOVASCULAR DISORDERS: ICD-10-CM

## 2024-12-24 DIAGNOSIS — Z80.3 FAMILY HISTORY OF MALIGNANT NEOPLASM OF BREAST: ICD-10-CM

## 2024-12-24 DIAGNOSIS — Z87.42 PERSONAL HISTORY OF OTHER DISEASES OF THE FEMALE GENITAL TRACT: ICD-10-CM

## 2024-12-24 DIAGNOSIS — M54.16 RADICULOPATHY, LUMBAR REGION: ICD-10-CM

## 2024-12-24 DIAGNOSIS — F17.290 NICOTINE DEPENDENCE, OTHER TOBACCO PRODUCT, UNCOMPLICATED: ICD-10-CM

## 2024-12-24 DIAGNOSIS — Z87.892 PERSONAL HISTORY OF ANAPHYLAXIS: ICD-10-CM

## 2024-12-24 DIAGNOSIS — R51.9 HEADACHE, UNSPECIFIED: ICD-10-CM

## 2024-12-24 DIAGNOSIS — M54.12 RADICULOPATHY, CERVICAL REGION: ICD-10-CM

## 2024-12-24 DIAGNOSIS — E66.01 MORBID (SEVERE) OBESITY DUE TO EXCESS CALORIES: ICD-10-CM

## 2024-12-24 DIAGNOSIS — M54.9 DORSALGIA, UNSPECIFIED: ICD-10-CM

## 2024-12-24 DIAGNOSIS — Z83.3 FAMILY HISTORY OF DIABETES MELLITUS: ICD-10-CM

## 2024-12-24 DIAGNOSIS — Z81.8 FAMILY HISTORY OF OTHER MENTAL AND BEHAVIORAL DISORDERS: ICD-10-CM

## 2024-12-24 DIAGNOSIS — M19.90 UNSPECIFIED OSTEOARTHRITIS, UNSPECIFIED SITE: ICD-10-CM

## 2024-12-24 PROCEDURE — 99385 PREV VISIT NEW AGE 18-39: CPT

## 2024-12-24 PROCEDURE — 93000 ELECTROCARDIOGRAM COMPLETE: CPT

## 2024-12-24 PROCEDURE — G0447 BEHAVIOR COUNSEL OBESITY 15M: CPT

## 2024-12-24 PROCEDURE — 36415 COLL VENOUS BLD VENIPUNCTURE: CPT

## 2024-12-24 PROCEDURE — 99406 BEHAV CHNG SMOKING 3-10 MIN: CPT

## 2024-12-24 RX ORDER — TRIAMCINOLONE ACETONIDE 0.25 MG/G
0.03 OINTMENT TOPICAL TWICE DAILY
Qty: 1 | Refills: 2 | Status: ACTIVE | COMMUNITY
Start: 2024-12-24 | End: 1900-01-01

## 2024-12-24 RX ORDER — OMEGA-3/DHA/EPA/FISH OIL 300-1000MG
CAPSULE ORAL
Refills: 0 | Status: ACTIVE | COMMUNITY

## 2024-12-24 RX ORDER — EPINEPHRINE 0.3 MG/.3ML
0.3 INJECTION INTRAMUSCULAR
Qty: 1 | Refills: 5 | Status: ACTIVE | COMMUNITY
Start: 2024-12-24 | End: 1900-01-01

## 2024-12-26 DIAGNOSIS — E55.9 VITAMIN D DEFICIENCY, UNSPECIFIED: ICD-10-CM

## 2024-12-26 RX ORDER — CHOLECALCIFEROL (VITAMIN D3) 1250 MCG
1.25 MG CAPSULE ORAL
Qty: 13 | Refills: 1 | Status: ACTIVE | COMMUNITY
Start: 2024-12-26 | End: 1900-01-01

## 2024-12-28 LAB
25(OH)D3 SERPL-MCNC: 15.3 NG/ML
ALBUMIN SERPL ELPH-MCNC: 3.9 G/DL
ALP BLD-CCNC: 93 U/L
ALT SERPL-CCNC: 13 U/L
ANION GAP SERPL CALC-SCNC: 10 MMOL/L
AST SERPL-CCNC: 19 U/L
BILIRUB SERPL-MCNC: 0.2 MG/DL
BUN SERPL-MCNC: 9 MG/DL
CALCIUM SERPL-MCNC: 9.2 MG/DL
CHLORIDE SERPL-SCNC: 102 MMOL/L
CHOLEST SERPL-MCNC: 165 MG/DL
CO2 SERPL-SCNC: 25 MMOL/L
CREAT SERPL-MCNC: 0.77 MG/DL
EGFR: 102 ML/MIN/1.73M2
ESTIMATED AVERAGE GLUCOSE: 111 MG/DL
FOLATE SERPL-MCNC: 7.1 NG/ML
GLUCOSE SERPL-MCNC: 84 MG/DL
HBA1C MFR BLD HPLC: 5.5 %
HCT VFR BLD CALC: 38.3 %
HDLC SERPL-MCNC: 47 MG/DL
HGB BLD-MCNC: 12.1 G/DL
LDLC SERPL CALC-MCNC: 106 MG/DL
MCHC RBC-ENTMCNC: 24.9 PG
MCHC RBC-ENTMCNC: 31.6 G/DL
MCV RBC AUTO: 79 FL
NONHDLC SERPL-MCNC: 119 MG/DL
PLATELET # BLD AUTO: 398 K/UL
POTASSIUM SERPL-SCNC: 4.9 MMOL/L
PROT SERPL-MCNC: 7.2 G/DL
RBC # BLD: 4.85 M/UL
RBC # FLD: 16.3 %
SODIUM SERPL-SCNC: 137 MMOL/L
TRIGL SERPL-MCNC: 66 MG/DL
TSH SERPL-ACNC: 1.04 UIU/ML
VIT B12 SERPL-MCNC: 425 PG/ML
WBC # FLD AUTO: 4.41 K/UL

## 2025-03-25 ENCOUNTER — NON-APPOINTMENT (OUTPATIENT)
Age: 40
End: 2025-03-25

## 2025-03-25 DIAGNOSIS — Z86.39 PERSONAL HISTORY OF OTHER ENDOCRINE, NUTRITIONAL AND METABOLIC DISEASE: ICD-10-CM

## 2025-03-27 PROBLEM — E66.01 MORBID OBESITY: Status: ACTIVE | Noted: 2025-03-25

## 2025-04-03 ENCOUNTER — APPOINTMENT (OUTPATIENT)
Dept: SURGERY | Facility: CLINIC | Age: 40
End: 2025-04-03
Payer: MEDICAID

## 2025-04-03 VITALS
HEIGHT: 64 IN | WEIGHT: 293 LBS | RESPIRATION RATE: 16 BRPM | SYSTOLIC BLOOD PRESSURE: 128 MMHG | BODY MASS INDEX: 50.02 KG/M2 | OXYGEN SATURATION: 99 % | TEMPERATURE: 96.5 F | HEART RATE: 80 BPM | DIASTOLIC BLOOD PRESSURE: 83 MMHG

## 2025-04-03 DIAGNOSIS — E66.01 MORBID (SEVERE) OBESITY DUE TO EXCESS CALORIES: ICD-10-CM

## 2025-04-03 PROCEDURE — 99205 OFFICE O/P NEW HI 60 MIN: CPT

## 2025-05-20 ENCOUNTER — APPOINTMENT (OUTPATIENT)
Dept: SURGERY | Facility: CLINIC | Age: 40
End: 2025-05-20
Payer: MEDICAID

## 2025-05-20 PROCEDURE — 97802 MEDICAL NUTRITION INDIV IN: CPT | Mod: 95

## 2025-06-09 ENCOUNTER — APPOINTMENT (OUTPATIENT)
Dept: PSYCHIATRY | Facility: CLINIC | Age: 40
End: 2025-06-09
Payer: MEDICAID

## 2025-06-09 VITALS — WEIGHT: 293 LBS | HEIGHT: 64 IN | BODY MASS INDEX: 50.02 KG/M2

## 2025-06-09 PROBLEM — Z78.9 CONSUMES ALCOHOL WEEKLY: Status: ACTIVE | Noted: 2025-06-09

## 2025-06-09 PROBLEM — Z78.9 DOES NOT USE ILLICIT DRUGS: Status: ACTIVE | Noted: 2025-06-09

## 2025-06-09 PROCEDURE — 90791 PSYCH DIAGNOSTIC EVALUATION: CPT | Mod: 95

## 2025-06-23 ENCOUNTER — APPOINTMENT (OUTPATIENT)
Dept: SURGERY | Facility: CLINIC | Age: 40
End: 2025-06-23
Payer: MEDICAID

## 2025-06-23 PROCEDURE — 97803 MED NUTRITION INDIV SUBSEQ: CPT | Mod: 95

## 2025-06-24 ENCOUNTER — APPOINTMENT (OUTPATIENT)
Dept: FAMILY MEDICINE | Facility: CLINIC | Age: 40
End: 2025-06-24

## 2025-07-21 ENCOUNTER — APPOINTMENT (OUTPATIENT)
Dept: PSYCHIATRY | Facility: CLINIC | Age: 40
End: 2025-07-21

## 2025-08-04 ENCOUNTER — APPOINTMENT (OUTPATIENT)
Dept: INTERNAL MEDICINE | Facility: CLINIC | Age: 40
End: 2025-08-04

## 2025-08-13 ENCOUNTER — APPOINTMENT (OUTPATIENT)
Dept: SURGERY | Facility: CLINIC | Age: 40
End: 2025-08-13
Payer: MEDICAID

## 2025-08-13 PROCEDURE — 97803 MED NUTRITION INDIV SUBSEQ: CPT | Mod: 95

## 2025-08-18 ENCOUNTER — APPOINTMENT (OUTPATIENT)
Dept: GASTROENTEROLOGY | Facility: CLINIC | Age: 40
End: 2025-08-18
Payer: MEDICAID

## 2025-08-18 VITALS
HEIGHT: 64 IN | HEART RATE: 66 BPM | TEMPERATURE: 97.5 F | SYSTOLIC BLOOD PRESSURE: 110 MMHG | DIASTOLIC BLOOD PRESSURE: 78 MMHG | OXYGEN SATURATION: 99 % | WEIGHT: 293 LBS | BODY MASS INDEX: 50.02 KG/M2

## 2025-08-18 DIAGNOSIS — Z01.818 ENCOUNTER FOR OTHER PREPROCEDURAL EXAMINATION: ICD-10-CM

## 2025-08-18 DIAGNOSIS — K21.9 GASTRO-ESOPHAGEAL REFLUX DISEASE W/OUT ESOPHAGITIS: ICD-10-CM

## 2025-08-18 PROCEDURE — 99203 OFFICE O/P NEW LOW 30 MIN: CPT

## 2025-08-20 ENCOUNTER — APPOINTMENT (OUTPATIENT)
Dept: OBGYN | Facility: CLINIC | Age: 40
End: 2025-08-20

## 2025-09-19 ENCOUNTER — APPOINTMENT (OUTPATIENT)
Dept: RADIOLOGY | Facility: HOSPITAL | Age: 40
End: 2025-09-19

## 2025-09-19 ENCOUNTER — APPOINTMENT (OUTPATIENT)
Dept: ULTRASOUND IMAGING | Facility: HOSPITAL | Age: 40
End: 2025-09-19